# Patient Record
(demographics unavailable — no encounter records)

---

## 2025-02-14 NOTE — [id number]
Operative Report


DATE OF OPERATION:  2/14/25 





PROCEDURE:  Colonoscopy with biopsy.





PREOPERATIVE INDICATION:  The patient is a 76 -year-old male  undergoing 


colonoscopy for surveillance with personal history of colon polyps





POSTOPERATIVE DIAGNOSES: 





1. Moderate sigmoid diverticular disease with sigmoid fixation tortuosity and 

mild sigmoiditis from which biopsies were obtained 





2. There appeared to be mild proctitis involving the distal 3-5 cm of the rectum

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 





3. Otherwise essentially completely normal examination up to the cecum with no 

new polyps at this time  








PROCEDURE PERFORMED BY:  Rosalia Estevez M.D.





SCOPE:  Olympus videocolonoscope.





ASA CLASS:  2. 





PREOPERATIVE MEDICATIONS:  Versed 4 mg, Fentanyl 100 mcg, Benadryl 50 mg





PROCEDURE IN DETAIL:  After obtaining an informed consent, the patient was 


placed on left lateral decubitus position.  He  was then sedated with the above 


medications.  A rectal examination was performed that was normal.  The 


colonoscope was then passed through the anus into the rectosigmoid and 


through the descending, transverse, and ascending colon up to the cecum 


with visualization of the appendiceal orifice, base of the cecum and the 

ileocecal valve.  The 


colonoscope was then withdrawn.  No polyps or masses were seen 


Patient had moderate sigmoid diverticular disease with sigmoid fixation and mild

sigmoiditis 


Sigmoid biopsies were obtained.  There was also some minimal inflammatory 

changes in the rectum in the distal 3-5 cm


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 

were obtained.  


On retroflexion patient had trace to 1+ internal hemorrhoids


The patient tolerated the procedure well without difficulty.





WITHDRAWAL TIME:  6. Minutes





QUALITY OF THE PREP: [place] Bowel Prep score:  9. 





COMPLICATIONS :  None





SPECIMENS:  


Sigmoid biopsies, sigmoiditis 


Rectal biopsies, proctitis





DISPOSITION:  


Stable


D/C to home





PLAN:  


1. Repeat colonoscopy likely in five years 


2. Await biopsy results


3. Increase fluid and fiber intake


4. Outpatient follow up with me in 4-6 weeks to review results and discuss 

further management


5. Resume soft mechanical diet advance as tolerated











ROSALIA ESTEVEZ MD                Feb 14, 2025 12:10